# Patient Record
Sex: FEMALE | Race: WHITE | NOT HISPANIC OR LATINO
[De-identification: names, ages, dates, MRNs, and addresses within clinical notes are randomized per-mention and may not be internally consistent; named-entity substitution may affect disease eponyms.]

---

## 2024-04-29 ENCOUNTER — APPOINTMENT (OUTPATIENT)
Dept: ORTHOPEDIC SURGERY | Facility: CLINIC | Age: 79
End: 2024-04-29
Payer: MEDICARE

## 2024-04-29 VITALS — BODY MASS INDEX: 32 KG/M2 | HEIGHT: 60 IN | WEIGHT: 163 LBS

## 2024-04-29 PROBLEM — Z00.00 ENCOUNTER FOR PREVENTIVE HEALTH EXAMINATION: Status: ACTIVE | Noted: 2024-04-29

## 2024-04-29 PROCEDURE — L3670: CPT | Mod: RT

## 2024-04-29 PROCEDURE — 99203 OFFICE O/P NEW LOW 30 MIN: CPT | Mod: 25

## 2024-04-29 RX ORDER — TRAMADOL HYDROCHLORIDE 50 MG/1
50 TABLET, COATED ORAL
Qty: 10 | Refills: 0 | Status: ACTIVE | COMMUNITY
Start: 2024-04-29 | End: 1900-01-01

## 2024-04-29 RX ORDER — METFORMIN HYDROCHLORIDE 625 MG/1
TABLET ORAL
Refills: 0 | Status: ACTIVE | COMMUNITY

## 2024-04-29 RX ORDER — METOPROLOL TARTRATE 75 MG/1
TABLET, FILM COATED ORAL
Refills: 0 | Status: ACTIVE | COMMUNITY

## 2024-04-29 RX ORDER — SIMVASTATIN 80 MG/1
TABLET, FILM COATED ORAL
Refills: 0 | Status: ACTIVE | COMMUNITY

## 2024-04-29 RX ORDER — LOSARTAN POTASSIUM 100 MG/1
TABLET, FILM COATED ORAL
Refills: 0 | Status: ACTIVE | COMMUNITY

## 2024-04-29 NOTE — DISCUSSION/SUMMARY
[de-identified] : Right shoulder pain  HPI Patient is 78-year-old female accompanied by her daughter who reports to the office for evaluation of her right shoulder pain since yesterday, 4/28/2024.  She accidentally tripped on uneven ground and landed on her right shoulder.  She is right-hand dominant.  She went to [facility] urgent care where they performed x-rays and confirmed that the patient has a proximal humerus fracture.  She was provided with a sling which she has been using since.  Patient has been unable to range her shoulder secondary to the pain.  Admits to shoulder swelling.  Denies any numbness or tingling.  She was prescribed ibuprofen 600 mg and cyclobenzaprine 5 mg which has given her some relief.  Right shoulder x-rays taken at [facility] urgent care were reviewed in the office today on a CD that was brought in by the patient.  It revealed a impacted/displaced proximal humerus fracture.  No dislocation.  Otherwise, no other significant abnormalities were seen.  Right shoulder exam is as follows: Shoulder swelling noted.  No erythema or ecchymosis.  Limited range of motion of shoulder secondary to fracture.  Difficult to assess strength testing secondary to limited ROM/pain.  Light touch intact throughout.  Neurovascular intact.  Assessment/plan Patient was provided with a new arm sling for support/stability.  Explained to the patient that she should keep the sling on at all times including during sleep.  She may take it on and off for hygiene purposes only.  She understands and will comply.  The patient was advised to rest/ice the area and may alternate with warm compresses as needed.   Patient was advised to continue ibuprofen 600 mg as needed for pain.  Discontinue cyclobenzaprine.  Tramadol 50 mg Rx was sent to her pharmacy to help alleviate her symptoms as well.  She will follow-up in 2 weeks for repeat right shoulder x-rays and further evaluation.  All questions/concerns were answered in detail.

## 2024-05-16 ENCOUNTER — APPOINTMENT (OUTPATIENT)
Dept: ORTHOPEDIC SURGERY | Facility: CLINIC | Age: 79
End: 2024-05-16
Payer: MEDICARE

## 2024-05-16 PROCEDURE — 99213 OFFICE O/P EST LOW 20 MIN: CPT | Mod: 25

## 2024-05-16 PROCEDURE — 73030 X-RAY EXAM OF SHOULDER: CPT | Mod: RT

## 2024-05-16 PROCEDURE — 23600 CLTX PROX HUMRL FX W/O MNPJ: CPT | Mod: RT

## 2024-05-16 NOTE — HISTORY OF PRESENT ILLNESS
[de-identified] : 79-year-old right-hand-dominant woman presents to this office on referral for management of a 3 fragment 2 part right proximal humerus fracture sustained as result of a ground-level fall April 28, 2024.  Initially seen at [facility] subsequently in our walk-in clinic the patient remains in sling.  She is managing her discomfort with ibuprofen and occasional use of tramadol.  No sensory complaints.  Pain is localized to the area of the fracture and about her deltoid insertion on her upper arm.  She denies any sensory complaints.  She has a prior history of wrist fractures and no known history for osteoporosis.  She is not currently receiving any medication for osteoporosis.

## 2024-05-16 NOTE — IMAGING
[de-identified] : Pleasant somewhat overweight older woman stands reasonably comfortably in the office.  She is accompanied by her daughter in the exam room.  Physical examination: Right shoulder: No tenderness palpation along acromial arch or clavicle.  No tenderness palpation along the bony prominence of elbow.  She has mild tenderness palpation near the fracture.  With some coaxing she is able to do some pendulum exercises and elbow range of motion exercises.  Her elbow is stiff from prolonged immobilization with an arc of motion of 30 to 110 degrees.  She is unrestricted wrist and digital range of motion.  No undue swelling about her hand.  Good cap refill.  Normal light sensation motor fingers.  Intact intrinsic hand function.  Radiographs: Right shoulder (AP, internal Tatian lateral, Y scapular): 3 fragment 2 part right proximal humerus fracture in which the proximal segment of her humerus is fractured at the surgical neck and is retroverted and in slight varus.

## 2024-05-16 NOTE — ASSESSMENT
[FreeTextEntry1] : 79-year-old woman relatively low demand right-hand-dominant proximal humerus fracture.  Discussed treatment options at length.  The patient would prefer nonoperative management.  Understands that she may lose some range of motion of her shoulder.  I like her to start pendulum exercises and elbow range of motion exercises.  Sling for comfort.  Will transition off narcotic medication.  Tylenol and over-the-counter NSAIDs for pain relief.  NSAID precautions discussed.  Follow-up in 2 weeks time for repeat evaluation radiographs of the right shoulder.  Any problems or [employer]inder sooner.  All questions answered to her satisfaction.

## 2024-06-06 ENCOUNTER — APPOINTMENT (OUTPATIENT)
Dept: ORTHOPEDIC SURGERY | Facility: CLINIC | Age: 79
End: 2024-06-06
Payer: MEDICARE

## 2024-06-06 ENCOUNTER — NON-APPOINTMENT (OUTPATIENT)
Age: 79
End: 2024-06-06

## 2024-06-06 DIAGNOSIS — S42.201A UNSPECIFIED FRACTURE OF UPPER END OF RIGHT HUMERUS, INITIAL ENCOUNTER FOR CLOSED FRACTURE: ICD-10-CM

## 2024-06-06 PROCEDURE — 73030 X-RAY EXAM OF SHOULDER: CPT | Mod: RT

## 2024-06-06 PROCEDURE — 99213 OFFICE O/P EST LOW 20 MIN: CPT | Mod: 24

## 2024-06-06 NOTE — HISTORY OF PRESENT ILLNESS
[de-identified] : 71-year-old female here for repeat evaluation of fracture to the right shoulder, she is about 5 weeks status post proximal displaced humeral fracture. Overall patient is doing well, she does admit to improvement in symptoms, patient states that her pain level is about 5/10.  Patient states that she lives alone so there are certain activities of daily living that they are difficult due. Patient states that she will be moving to [place] in about 6 weeks.

## 2024-06-06 NOTE — IMAGING
[de-identified] : On examination of the right shoulder, patient has minimal discomfort on palpating over the fracture site.  Patient has decreased range of motion to the shoulder, she is able to active assistive range her right shoulder and forward flexion to about 45 degrees and abduct to about 30 degrees with mild end of range pain. Patient has good range of motion to the elbow, mild stiffness, good range of motion to the wrist and digits. Neurovascular intact.  Radiographs of the right shoulder were done in office today, 3 views were taken, there is no change in the alignment of the fracture, there is some callus formation noted.

## 2024-07-05 ENCOUNTER — APPOINTMENT (OUTPATIENT)
Dept: ORTHOPEDIC SURGERY | Facility: CLINIC | Age: 79
End: 2024-07-05
Payer: MEDICARE

## 2024-07-05 DIAGNOSIS — S42.201A UNSPECIFIED FRACTURE OF UPPER END OF RIGHT HUMERUS, INITIAL ENCOUNTER FOR CLOSED FRACTURE: ICD-10-CM

## 2024-07-05 PROCEDURE — 73030 X-RAY EXAM OF SHOULDER: CPT | Mod: RT

## 2024-07-05 PROCEDURE — 99213 OFFICE O/P EST LOW 20 MIN: CPT
